# Patient Record
Sex: MALE | Race: WHITE | Employment: OTHER | URBAN - METROPOLITAN AREA
[De-identification: names, ages, dates, MRNs, and addresses within clinical notes are randomized per-mention and may not be internally consistent; named-entity substitution may affect disease eponyms.]

---

## 2019-01-03 ENCOUNTER — HOSPITAL ENCOUNTER (OUTPATIENT)
Age: 84
Setting detail: OUTPATIENT SURGERY
Discharge: HOME | End: 2019-01-03
Attending: SURGERY | Admitting: SURGERY
Payer: MEDICARE

## 2019-01-03 VITALS
OXYGEN SATURATION: 96 % | RESPIRATION RATE: 17 BRPM | SYSTOLIC BLOOD PRESSURE: 155 MMHG | HEART RATE: 68 BPM | DIASTOLIC BLOOD PRESSURE: 84 MMHG | BODY MASS INDEX: 25.77 KG/M2 | WEIGHT: 180 LBS | HEIGHT: 70 IN | TEMPERATURE: 97.2 F

## 2019-01-03 PROBLEM — K40.90 RIGHT INGUINAL HERNIA: Status: RESOLVED | Noted: 2019-01-03 | Resolved: 2019-01-03

## 2019-01-03 PROBLEM — R35.1 BENIGN PROSTATIC HYPERPLASIA WITH NOCTURIA: Chronic | Status: ACTIVE | Noted: 2019-01-03

## 2019-01-03 PROBLEM — K40.90 RIGHT INGUINAL HERNIA: Status: ACTIVE | Noted: 2019-01-03

## 2019-01-03 PROBLEM — N40.1 BENIGN PROSTATIC HYPERPLASIA WITH NOCTURIA: Chronic | Status: ACTIVE | Noted: 2019-01-03

## 2019-01-03 PROCEDURE — 6360000002 HC RX W HCPCS: Performed by: SURGERY

## 2019-01-03 PROCEDURE — 99153 MOD SED SAME PHYS/QHP EA: CPT | Performed by: SURGERY

## 2019-01-03 PROCEDURE — 2500000003 HC RX 250 WO HCPCS: Performed by: SURGERY

## 2019-01-03 PROCEDURE — 2709999900 HC NON-CHARGEABLE SUPPLY: Performed by: SURGERY

## 2019-01-03 PROCEDURE — C1781 MESH (IMPLANTABLE): HCPCS | Performed by: SURGERY

## 2019-01-03 PROCEDURE — 2580000003 HC RX 258: Performed by: SURGERY

## 2019-01-03 PROCEDURE — 99152 MOD SED SAME PHYS/QHP 5/>YRS: CPT | Performed by: SURGERY

## 2019-01-03 PROCEDURE — 7100000011 HC PHASE II RECOVERY - ADDTL 15 MIN: Performed by: SURGERY

## 2019-01-03 PROCEDURE — 3600000012 HC SURGERY LEVEL 2 ADDTL 15MIN: Performed by: SURGERY

## 2019-01-03 PROCEDURE — 7100000010 HC PHASE II RECOVERY - FIRST 15 MIN: Performed by: SURGERY

## 2019-01-03 PROCEDURE — 3600000002 HC SURGERY LEVEL 2 BASE: Performed by: SURGERY

## 2019-01-03 DEVICE — MESH SURG W4XL6IN STD FOR INGUINAL HERN REP PROLITE ULT: Type: IMPLANTABLE DEVICE | Site: GROIN | Status: FUNCTIONAL

## 2019-01-03 RX ORDER — FAMOTIDINE 20 MG/1
20 TABLET, FILM COATED ORAL 2 TIMES DAILY
COMMUNITY

## 2019-01-03 RX ORDER — FENTANYL CITRATE 50 UG/ML
INJECTION, SOLUTION INTRAMUSCULAR; INTRAVENOUS PRN
Status: DISCONTINUED | OUTPATIENT
Start: 2019-01-03 | End: 2019-01-03 | Stop reason: HOSPADM

## 2019-01-03 RX ORDER — SODIUM CHLORIDE, SODIUM LACTATE, POTASSIUM CHLORIDE, CALCIUM CHLORIDE 600; 310; 30; 20 MG/100ML; MG/100ML; MG/100ML; MG/100ML
INJECTION, SOLUTION INTRAVENOUS CONTINUOUS
Status: DISCONTINUED | OUTPATIENT
Start: 2019-01-03 | End: 2019-01-03 | Stop reason: HOSPADM

## 2019-01-03 RX ORDER — CEFAZOLIN SODIUM 2 G/50ML
2 SOLUTION INTRAVENOUS ONCE
Status: DISCONTINUED | OUTPATIENT
Start: 2019-01-03 | End: 2019-01-03 | Stop reason: HOSPADM

## 2019-01-03 RX ORDER — KETOROLAC TROMETHAMINE 15 MG/ML
15 INJECTION, SOLUTION INTRAMUSCULAR; INTRAVENOUS ONCE
Status: COMPLETED | OUTPATIENT
Start: 2019-01-03 | End: 2019-01-03

## 2019-01-03 RX ORDER — SUMATRIPTAN 50 MG/1
50 TABLET, FILM COATED ORAL
COMMUNITY

## 2019-01-03 RX ORDER — ACETAMINOPHEN 500 MG
500 TABLET ORAL EVERY 6 HOURS PRN
COMMUNITY

## 2019-01-03 RX ORDER — OMEPRAZOLE 20 MG/1
20 CAPSULE, DELAYED RELEASE ORAL DAILY
COMMUNITY

## 2019-01-03 RX ORDER — MIDAZOLAM HYDROCHLORIDE 1 MG/ML
INJECTION INTRAMUSCULAR; INTRAVENOUS PRN
Status: DISCONTINUED | OUTPATIENT
Start: 2019-01-03 | End: 2019-01-03 | Stop reason: HOSPADM

## 2019-01-03 RX ORDER — MAGNESIUM OXIDE 400 MG/1
400 TABLET ORAL DAILY
COMMUNITY

## 2019-01-03 RX ORDER — ANTIOX #8/OM3/DHA/EPA/LUT/ZEAX 250-2.5 MG
2 CAPSULE ORAL
COMMUNITY

## 2019-01-03 RX ADMIN — KETOROLAC TROMETHAMINE 15 MG: 15 INJECTION, SOLUTION INTRAMUSCULAR; INTRAVENOUS at 09:11

## 2019-01-03 RX ADMIN — SODIUM CHLORIDE, POTASSIUM CHLORIDE, SODIUM LACTATE AND CALCIUM CHLORIDE: 600; 310; 30; 20 INJECTION, SOLUTION INTRAVENOUS at 06:57

## 2019-01-03 ASSESSMENT — PAIN SCALES - GENERAL
PAINLEVEL_OUTOF10: 0

## 2019-01-03 ASSESSMENT — PAIN - FUNCTIONAL ASSESSMENT: PAIN_FUNCTIONAL_ASSESSMENT: 0-10

## 2019-01-03 ASSESSMENT — PAIN DESCRIPTION - DESCRIPTORS: DESCRIPTORS: NAGGING

## 2023-03-01 PROBLEM — C44.329 SQUAMOUS CELL CARCINOMA OF CHEEK: Status: ACTIVE | Noted: 2023-03-01

## 2023-03-01 PROBLEM — C44.01 BASAL CELL CARCINOMA OF LOWER LIP: Status: ACTIVE | Noted: 2023-03-01

## 2023-03-01 PROBLEM — C44.42 SQUAMOUS CELL CARCINOMA, SCALP/NECK: Status: ACTIVE | Noted: 2023-03-01

## 2023-03-29 ENCOUNTER — HOSPITAL ENCOUNTER (OUTPATIENT)
Dept: PREADMISSION TESTING | Age: 88
Discharge: HOME | End: 2023-04-02

## 2023-03-29 VITALS
HEIGHT: 70 IN | BODY MASS INDEX: 26.48 KG/M2 | RESPIRATION RATE: 16 BRPM | OXYGEN SATURATION: 96 % | WEIGHT: 185 LBS | DIASTOLIC BLOOD PRESSURE: 87 MMHG | SYSTOLIC BLOOD PRESSURE: 134 MMHG

## 2023-03-29 RX ORDER — AZELASTINE 1 MG/ML
SPRAY, METERED NASAL
COMMUNITY
Start: 2023-01-23

## 2023-03-29 NOTE — DISCHARGE INSTRUCTIONS
Preoperative Instructions:    Stop eating solid foods at midnight the night prior to your surgery. Stop drinking clear liquids at midnight the night prior to your surgery. Arrive at the surgery center (3rd entrance) on ___1-14-19____________ by ___0600am____________. Please stop any blood thinning medications as directed by your surgeon or prescribing physician. Failure to stop certain medications may interfere with your scheduled surgery. These may include: Aspirin, Coumadin, Plavix, NSAIDS (Motrin, Aleve, Advil, Mobic, Celebrex), Eliquis, Pradaxa, Xarelto, Fish oil, and herbal supplements. Hold Multivitamin as directed. You may continue the rest of your medications through the night before surgery unless instructed otherwise. Day of surgery please take only the following medication(s) with a small sip of water: Omeprazole      Please  shower with antibacterial soap and water the morning of surgery. Please bring denture cup. Reminders:  -If you are going home the day of your procedure, you will need a family member or friend to stay during the procedure and drive you home after your procedure. Your  must be 25years of age or older and able to sign off on your discharge instructions.    -If you are going home the same day of your surgery, someone must remain with you for the first 24 hours after your surgery if you receive sedation or anesthesia.      -Please do not wear any jewelery , lotions, or body piercing the day of surgery

## 2023-04-11 ENCOUNTER — HOSPITAL ENCOUNTER (OUTPATIENT)
Age: 88
Setting detail: OUTPATIENT SURGERY
Discharge: HOME | End: 2023-04-11
Attending: PLASTIC SURGERY | Admitting: PLASTIC SURGERY
Payer: MEDICARE

## 2023-04-11 VITALS
OXYGEN SATURATION: 96 % | TEMPERATURE: 95.5 F | HEART RATE: 59 BPM | SYSTOLIC BLOOD PRESSURE: 91 MMHG | BODY MASS INDEX: 25.77 KG/M2 | WEIGHT: 180 LBS | RESPIRATION RATE: 17 BRPM | DIASTOLIC BLOOD PRESSURE: 75 MMHG | HEIGHT: 70 IN

## 2023-04-11 DIAGNOSIS — C44.42 SQUAMOUS CELL CARCINOMA OF SCALP: ICD-10-CM

## 2023-04-11 DIAGNOSIS — C44.329 SQUAMOUS CELL CANCER OF SKIN OF LEFT CHEEK: ICD-10-CM

## 2023-04-11 DIAGNOSIS — G89.18 PAIN FOLLOWING SURGERY OR PROCEDURE: Primary | ICD-10-CM

## 2023-04-11 DIAGNOSIS — C44.319 BASAL CELL CARCINOMA OF CHIN: ICD-10-CM

## 2023-04-11 PROCEDURE — 7100000010 HC PHASE II RECOVERY - FIRST 15 MIN: Performed by: PLASTIC SURGERY

## 2023-04-11 PROCEDURE — 3600000012 HC SURGERY LEVEL 2 ADDTL 15MIN: Performed by: PLASTIC SURGERY

## 2023-04-11 PROCEDURE — 3700000001 HC ADD 15 MINUTES (ANESTHESIA): Performed by: PLASTIC SURGERY

## 2023-04-11 PROCEDURE — 88305 TISSUE EXAM BY PATHOLOGIST: CPT

## 2023-04-11 PROCEDURE — 88331 PATH CONSLTJ SURG 1 BLK 1SPC: CPT

## 2023-04-11 PROCEDURE — 6360000002 HC RX W HCPCS

## 2023-04-11 PROCEDURE — 2500000003 HC RX 250 WO HCPCS: Performed by: PLASTIC SURGERY

## 2023-04-11 PROCEDURE — 7100000000 HC PACU RECOVERY - FIRST 15 MIN: Performed by: PLASTIC SURGERY

## 2023-04-11 PROCEDURE — 88332 PATH CONSLTJ SURG EA ADD BLK: CPT

## 2023-04-11 PROCEDURE — 2709999900 HC NON-CHARGEABLE SUPPLY: Performed by: PLASTIC SURGERY

## 2023-04-11 PROCEDURE — 7100000011 HC PHASE II RECOVERY - ADDTL 15 MIN: Performed by: PLASTIC SURGERY

## 2023-04-11 PROCEDURE — 7100000001 HC PACU RECOVERY - ADDTL 15 MIN: Performed by: PLASTIC SURGERY

## 2023-04-11 PROCEDURE — 3700000000 HC ANESTHESIA ATTENDED CARE: Performed by: PLASTIC SURGERY

## 2023-04-11 PROCEDURE — 3600000002 HC SURGERY LEVEL 2 BASE: Performed by: PLASTIC SURGERY

## 2023-04-11 PROCEDURE — 6370000000 HC RX 637 (ALT 250 FOR IP)

## 2023-04-11 RX ORDER — MIDAZOLAM HYDROCHLORIDE 2 MG/2ML
2 INJECTION, SOLUTION INTRAMUSCULAR; INTRAVENOUS
Status: DISCONTINUED | OUTPATIENT
Start: 2023-04-11 | End: 2023-04-11 | Stop reason: HOSPADM

## 2023-04-11 RX ORDER — SODIUM CHLORIDE 9 MG/ML
INJECTION, SOLUTION INTRAVENOUS PRN
Status: DISCONTINUED | OUTPATIENT
Start: 2023-04-11 | End: 2023-04-11 | Stop reason: HOSPADM

## 2023-04-11 RX ORDER — ONDANSETRON 2 MG/ML
4 INJECTION INTRAMUSCULAR; INTRAVENOUS
Status: COMPLETED | OUTPATIENT
Start: 2023-04-11 | End: 2023-04-11

## 2023-04-11 RX ORDER — SODIUM CHLORIDE 9 MG/ML
25 INJECTION, SOLUTION INTRAVENOUS PRN
Status: DISCONTINUED | OUTPATIENT
Start: 2023-04-11 | End: 2023-04-11 | Stop reason: HOSPADM

## 2023-04-11 RX ORDER — SODIUM CHLORIDE, SODIUM LACTATE, POTASSIUM CHLORIDE, CALCIUM CHLORIDE 600; 310; 30; 20 MG/100ML; MG/100ML; MG/100ML; MG/100ML
INJECTION, SOLUTION INTRAVENOUS CONTINUOUS
Status: DISCONTINUED | OUTPATIENT
Start: 2023-04-11 | End: 2023-04-11 | Stop reason: HOSPADM

## 2023-04-11 RX ORDER — BUPIVACAINE HYDROCHLORIDE AND EPINEPHRINE 2.5; 5 MG/ML; UG/ML
INJECTION, SOLUTION EPIDURAL; INFILTRATION; INTRACAUDAL; PERINEURAL PRN
Status: DISCONTINUED | OUTPATIENT
Start: 2023-04-11 | End: 2023-04-11 | Stop reason: ALTCHOICE

## 2023-04-11 RX ORDER — MEPERIDINE HYDROCHLORIDE 50 MG/ML
12.5 INJECTION INTRAMUSCULAR; INTRAVENOUS; SUBCUTANEOUS EVERY 5 MIN PRN
Status: DISCONTINUED | OUTPATIENT
Start: 2023-04-11 | End: 2023-04-11 | Stop reason: HOSPADM

## 2023-04-11 RX ORDER — CEFAZOLIN 2 G/1
INJECTION, POWDER, FOR SOLUTION INTRAMUSCULAR; INTRAVENOUS
Status: COMPLETED
Start: 2023-04-11 | End: 2023-04-11

## 2023-04-11 RX ORDER — LABETALOL HYDROCHLORIDE 5 MG/ML
10 INJECTION, SOLUTION INTRAVENOUS
Status: DISCONTINUED | OUTPATIENT
Start: 2023-04-11 | End: 2023-04-11 | Stop reason: HOSPADM

## 2023-04-11 RX ORDER — SODIUM CHLORIDE 0.9 % (FLUSH) 0.9 %
5-40 SYRINGE (ML) INJECTION EVERY 12 HOURS SCHEDULED
Status: DISCONTINUED | OUTPATIENT
Start: 2023-04-11 | End: 2023-04-11 | Stop reason: HOSPADM

## 2023-04-11 RX ORDER — HYDROCODONE BITARTRATE AND ACETAMINOPHEN 5; 325 MG/1; MG/1
1 TABLET ORAL
Status: COMPLETED | OUTPATIENT
Start: 2023-04-11 | End: 2023-04-11

## 2023-04-11 RX ORDER — SODIUM CHLORIDE 0.9 % (FLUSH) 0.9 %
5-40 SYRINGE (ML) INJECTION PRN
Status: DISCONTINUED | OUTPATIENT
Start: 2023-04-11 | End: 2023-04-11 | Stop reason: HOSPADM

## 2023-04-11 RX ORDER — HYDROCODONE BITARTRATE AND ACETAMINOPHEN 5; 325 MG/1; MG/1
TABLET ORAL
Status: COMPLETED
Start: 2023-04-11 | End: 2023-04-11

## 2023-04-11 RX ORDER — HYDROCODONE BITARTRATE AND ACETAMINOPHEN 5; 325 MG/1; MG/1
1 TABLET ORAL EVERY 6 HOURS PRN
Qty: 20 TABLET | Refills: 0 | Status: SHIPPED | OUTPATIENT
Start: 2023-04-11 | End: 2023-04-16

## 2023-04-11 RX ORDER — PROMETHAZINE HYDROCHLORIDE 25 MG/ML
6.25 INJECTION, SOLUTION INTRAMUSCULAR; INTRAVENOUS EVERY 5 MIN PRN
Status: DISCONTINUED | OUTPATIENT
Start: 2023-04-11 | End: 2023-04-11 | Stop reason: HOSPADM

## 2023-04-11 RX ORDER — GLYCOPYRROLATE 0.2 MG/ML
0.4 INJECTION INTRAMUSCULAR; INTRAVENOUS ONCE
Status: DISCONTINUED | OUTPATIENT
Start: 2023-04-11 | End: 2023-04-11 | Stop reason: HOSPADM

## 2023-04-11 RX ORDER — DIPHENHYDRAMINE HYDROCHLORIDE 50 MG/ML
12.5 INJECTION INTRAMUSCULAR; INTRAVENOUS
Status: DISCONTINUED | OUTPATIENT
Start: 2023-04-11 | End: 2023-04-11 | Stop reason: HOSPADM

## 2023-04-11 RX ORDER — CEPHALEXIN 500 MG/1
500 CAPSULE ORAL 3 TIMES DAILY
Qty: 21 CAPSULE | Refills: 0 | Status: SHIPPED | OUTPATIENT
Start: 2023-04-11 | End: 2023-04-18

## 2023-04-11 RX ORDER — OXYCODONE HYDROCHLORIDE AND ACETAMINOPHEN 5; 325 MG/1; MG/1
1 TABLET ORAL
Status: DISCONTINUED | OUTPATIENT
Start: 2023-04-11 | End: 2023-04-11

## 2023-04-11 RX ORDER — IPRATROPIUM BROMIDE AND ALBUTEROL SULFATE 2.5; .5 MG/3ML; MG/3ML
1 SOLUTION RESPIRATORY (INHALATION)
Status: DISCONTINUED | OUTPATIENT
Start: 2023-04-11 | End: 2023-04-11 | Stop reason: HOSPADM

## 2023-04-11 RX ORDER — METOCLOPRAMIDE HYDROCHLORIDE 5 MG/ML
10 INJECTION INTRAMUSCULAR; INTRAVENOUS
Status: DISCONTINUED | OUTPATIENT
Start: 2023-04-11 | End: 2023-04-11 | Stop reason: HOSPADM

## 2023-04-11 RX ORDER — HYDRALAZINE HYDROCHLORIDE 20 MG/ML
10 INJECTION INTRAMUSCULAR; INTRAVENOUS
Status: DISCONTINUED | OUTPATIENT
Start: 2023-04-11 | End: 2023-04-11 | Stop reason: HOSPADM

## 2023-04-11 RX ORDER — OXYCODONE HYDROCHLORIDE AND ACETAMINOPHEN 5; 325 MG/1; MG/1
2 TABLET ORAL
Status: DISCONTINUED | OUTPATIENT
Start: 2023-04-11 | End: 2023-04-11

## 2023-04-11 RX ADMIN — HYDROCODONE BITARTRATE AND ACETAMINOPHEN 1 TABLET: 5; 325 TABLET ORAL at 10:17

## 2023-04-11 RX ADMIN — HYDROMORPHONE HYDROCHLORIDE 0.5 MG: 1 INJECTION, SOLUTION INTRAMUSCULAR; INTRAVENOUS; SUBCUTANEOUS at 09:53

## 2023-04-11 RX ADMIN — Medication 0.5 MG: at 09:53

## 2023-04-11 RX ADMIN — HYDROMORPHONE HYDROCHLORIDE 0.5 MG: 1 INJECTION, SOLUTION INTRAMUSCULAR; INTRAVENOUS; SUBCUTANEOUS at 09:37

## 2023-04-11 RX ADMIN — Medication 0.5 MG: at 09:37

## 2023-04-11 ASSESSMENT — PAIN DESCRIPTION - LOCATION
LOCATION: HEAD;THROAT
LOCATION: HEAD

## 2023-04-11 ASSESSMENT — PAIN DESCRIPTION - ORIENTATION
ORIENTATION: MID
ORIENTATION: MID

## 2023-04-11 ASSESSMENT — PAIN SCALES - GENERAL
PAINLEVEL_OUTOF10: 7
PAINLEVEL_OUTOF10: 10
PAINLEVEL_OUTOF10: 2
PAINLEVEL_OUTOF10: 7

## 2023-04-11 ASSESSMENT — PAIN DESCRIPTION - PAIN TYPE
TYPE: SURGICAL PAIN
TYPE: SURGICAL PAIN

## 2023-04-11 NOTE — H&P
Office Note     JORGE Cline MD, FACS     Subjective:      Patient ID: Wallace Abdul is a 81 y.o. male. HPI     Patient comes in today for Consultation regarding multiple facial malignancies found on shave biopsy by Dr. Poncho Hylton, dermatology. 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       Review of Systems     Past Medical History        Past Medical History:   Diagnosis Date    GERD (gastroesophageal reflux disease)           Past Surgical History         Past Surgical History:   Procedure Laterality Date    COLONOSCOPY   2016     x3    CYSTOSCOPY        HERNIA REPAIR Right 1/3/2019     HERNIA INGUINAL REPAIR RIGHT WITH MESH performed by Anitra Winn MD at [facility] 2014    INGUINAL HERNIA REPAIR Right 01/03/2019    PRE-MALIGNANT / BENIGN SKIN LESION EXCISION        PROSTATE BIOPSY   2015    SKIN BIOPSY        VASECTOMY                   Allergies   Allergen Reactions    Codeine         n/v    Tramadol         n/v      Current Facility-Administered Medications          Current Outpatient Medications   Medication Sig Dispense Refill    magnesium oxide (MAG-OX) 400 MG tablet Take 400 mg by mouth daily        SUMAtriptan (IMITREX) 50 MG tablet Take 50 mg by mouth once as needed for Migraine        Multiple Vitamins-Minerals (PRESERVISION AREDS 2) CAPS Take 2 tablets by mouth        omeprazole (PRILOSEC) 20 MG delayed release capsule Take 20 mg by mouth daily        acetaminophen (TYLENOL) 500 MG tablet Take 500 mg by mouth every 6 hours as needed for Pain        famotidine (PEPCID) 20 MG tablet Take 20 mg by mouth 2 times daily          No

## 2023-04-11 NOTE — OP NOTE
frozen section. 2. Forehead squamous cell carcinoma with margins clear confirmed on frozen section. 3.  Right lower lip basal cell carcinoma with 9:00 margin still involved. A new 9:00 margin was sent for permanent section. Detailed Description of Procedure: The patient was brought to the operating room and placed under general anesthesia. His face was prepped and draped in sterile fashion. The squamous cell carcinoma on the forehead, the squamous of carcinoma on the left cheek, and the basal carcinoma in his right lower lip were identified. 0.25% Marcaine with epinephrine was injected into the tissue surrounding each of the lesions. Each lesion was excised with a #15 blade and submitted for frozen section. Frozen section confirmed that the forehead squamous cell carcinoma and the left cheek squamous cell carcinoma had margins clear. The right lower lip basal cell carcinoma still had a small focus of basal cell at the 9:00 margin. A new 7-10 o'clock margin was excised and the new 9:00 margin marked. Total excised diameter of the squamous of carcinoma on the forehead was 2 x 2cm closed with a 5 x 6 cm bilateral V-Y-S flap closure. This was sutured with 3-0 Monocryl in the deep dermal layer, 4 Monocryl in the dermal layer, followed by 4-0 Monocryl running subcuticular on the skin and Dermabond for final closure. The left cheek defect measured approximately 1-1/2 x 1/2 cm. A rotational flap was diagrammed with a surgical marker and incised with a #15 blade. The flap measured approximate 3 x 4 cm and was rotated into close the defect. This was trimmed to fit and sutured with 3-0 Monocryl deep dermal layer, 4-0 Monocryl dermal layer, and 5-0 nylon on the skin. Steri-Strips were applied. Finally the right lower lip defect was addressed. This measured approximately 1-1/2 x 1 cm. A 3 x 4 cm rotational flap was diagrammed with a surgical marker and incised with a #15 blade.   The flap was elevated

## 2023-04-13 ENCOUNTER — HOSPITAL ENCOUNTER (EMERGENCY)
Age: 88
Discharge: HOME | End: 2023-04-13
Attending: EMERGENCY MEDICINE
Payer: MEDICARE

## 2023-04-13 VITALS
DIASTOLIC BLOOD PRESSURE: 74 MMHG | TEMPERATURE: 98.6 F | SYSTOLIC BLOOD PRESSURE: 130 MMHG | OXYGEN SATURATION: 93 % | HEIGHT: 70 IN | WEIGHT: 180 LBS | RESPIRATION RATE: 16 BRPM | BODY MASS INDEX: 25.77 KG/M2 | HEART RATE: 88 BPM

## 2023-04-13 DIAGNOSIS — N17.9 ACUTE KIDNEY INJURY (HCC): ICD-10-CM

## 2023-04-13 DIAGNOSIS — R33.8 ACUTE URINARY RETENTION: Primary | ICD-10-CM

## 2023-04-13 LAB
ABSOLUTE EOS #: 0 K/UL (ref 0–0.4)
ABSOLUTE LYMPH #: 0.61 K/UL (ref 1–4.8)
ABSOLUTE MONO #: 2.14 K/UL (ref 0.1–0.8)
ANION GAP SERPL CALCULATED.3IONS-SCNC: 13 MMOL/L (ref 9–17)
BACTERIA: ABNORMAL
BASOPHILS # BLD: 0 % (ref 0–2)
BASOPHILS ABSOLUTE: 0 K/UL (ref 0–0.2)
BILIRUBIN URINE: NEGATIVE
BUN SERPL-MCNC: 41 MG/DL (ref 8–23)
CALCIUM SERPL-MCNC: 10.1 MG/DL (ref 8.6–10.4)
CHLORIDE SERPL-SCNC: 104 MMOL/L (ref 98–107)
CO2 SERPL-SCNC: 21 MMOL/L (ref 20–31)
COLOR: YELLOW
CREAT SERPL-MCNC: 3.18 MG/DL (ref 0.7–1.2)
EOSINOPHILS RELATIVE PERCENT: 0 % (ref 1–4)
EPITHELIAL CELLS UA: ABNORMAL /HPF (ref 0–5)
GFR SERPL CREATININE-BSD FRML MDRD: 18 ML/MIN/1.73M2
GLUCOSE SERPL-MCNC: 126 MG/DL (ref 70–99)
GLUCOSE UR STRIP.AUTO-MCNC: NEGATIVE MG/DL
HCT VFR BLD AUTO: 41 % (ref 41–53)
HGB BLD-MCNC: 13.9 G/DL (ref 13.5–17.5)
KETONES UR STRIP.AUTO-MCNC: NEGATIVE MG/DL
LEUKOCYTE ESTERASE UR QL STRIP.AUTO: NEGATIVE
LYMPHOCYTES # BLD: 4 % (ref 24–44)
MCH RBC QN AUTO: 31.6 PG (ref 26–34)
MCHC RBC AUTO-ENTMCNC: 34 G/DL (ref 31–37)
MCV RBC AUTO: 93.1 FL (ref 80–100)
MONOCYTES # BLD: 14 % (ref 1–7)
MORPHOLOGY: NORMAL
NITRITE UR QL STRIP.AUTO: NEGATIVE
PDW BLD-RTO: 12.9 % (ref 12.5–15.4)
PLATELET # BLD AUTO: 158 K/UL (ref 140–450)
PMV BLD AUTO: 8.3 FL (ref 6–12)
POTASSIUM SERPL-SCNC: 4.3 MMOL/L (ref 3.7–5.3)
PROT UR STRIP.AUTO-MCNC: 6 MG/DL (ref 5–8)
PROT UR STRIP.AUTO-MCNC: NEGATIVE MG/DL
RBC # BLD: 4.41 M/UL (ref 4.5–5.9)
RBC CLUMPS #/AREA URNS AUTO: ABNORMAL /HPF (ref 0–2)
SEG NEUTROPHILS: 82 % (ref 36–66)
SEGMENTED NEUTROPHILS ABSOLUTE COUNT: 12.55 K/UL (ref 1.8–7.7)
SODIUM SERPL-SCNC: 138 MMOL/L (ref 135–144)
SPECIFIC GRAVITY UA: 1.01 (ref 1–1.03)
SURGICAL PATHOLOGY REPORT: NORMAL
TURBIDITY: CLEAR
URINE HGB: ABNORMAL
UROBILINOGEN, URINE: NORMAL
WBC # BLD AUTO: 15.3 K/UL (ref 3.5–11)
WBC UA: ABNORMAL /HPF (ref 0–5)

## 2023-04-13 PROCEDURE — 99283 EMERGENCY DEPT VISIT LOW MDM: CPT

## 2023-04-13 PROCEDURE — 6370000000 HC RX 637 (ALT 250 FOR IP)

## 2023-04-13 PROCEDURE — 80048 BASIC METABOLIC PNL TOTAL CA: CPT

## 2023-04-13 PROCEDURE — 81001 URINALYSIS AUTO W/SCOPE: CPT

## 2023-04-13 PROCEDURE — 36415 COLL VENOUS BLD VENIPUNCTURE: CPT

## 2023-04-13 PROCEDURE — 85025 COMPLETE CBC W/AUTO DIFF WBC: CPT

## 2023-04-13 RX ORDER — LIDOCAINE HYDROCHLORIDE 20 MG/ML
JELLY TOPICAL
Status: COMPLETED
Start: 2023-04-13 | End: 2023-04-13

## 2023-04-13 RX ORDER — LIDOCAINE HYDROCHLORIDE 20 MG/ML
JELLY TOPICAL ONCE
Status: COMPLETED | OUTPATIENT
Start: 2023-04-13 | End: 2023-04-13

## 2023-04-13 RX ORDER — 0.9 % SODIUM CHLORIDE 0.9 %
1000 INTRAVENOUS SOLUTION INTRAVENOUS ONCE
Status: DISCONTINUED | OUTPATIENT
Start: 2023-04-13 | End: 2023-04-13 | Stop reason: HOSPADM

## 2023-04-13 RX ADMIN — LIDOCAINE HYDROCHLORIDE: 20 JELLY TOPICAL at 12:15

## 2023-04-13 ASSESSMENT — PAIN SCALES - GENERAL: PAINLEVEL_OUTOF10: 10

## 2023-04-13 ASSESSMENT — PAIN DESCRIPTION - LOCATION: LOCATION: ABDOMEN

## 2023-04-13 ASSESSMENT — ENCOUNTER SYMPTOMS
RHINORRHEA: 0
SORE THROAT: 0
COUGH: 0
ABDOMINAL PAIN: 1
BACK PAIN: 0
NAUSEA: 1
SHORTNESS OF BREATH: 0
PHOTOPHOBIA: 0
VOMITING: 1

## 2023-04-13 ASSESSMENT — PAIN DESCRIPTION - ORIENTATION: ORIENTATION: LOWER

## 2023-04-13 ASSESSMENT — PAIN - FUNCTIONAL ASSESSMENT
PAIN_FUNCTIONAL_ASSESSMENT: NONE - DENIES PAIN
PAIN_FUNCTIONAL_ASSESSMENT: 0-10
PAIN_FUNCTIONAL_ASSESSMENT: NONE - DENIES PAIN

## 2023-04-13 ASSESSMENT — LIFESTYLE VARIABLES: HOW OFTEN DO YOU HAVE A DRINK CONTAINING ALCOHOL: NEVER

## 2023-04-13 NOTE — ED TRIAGE NOTES
Pt to ED with c/o post op issues. Pt had skin cancer on face removal Tuesday and since has not felt well. Pt denies pain at surgical sites but has incontinence, no appetite, N/V, and feeling very tired.

## 2023-04-13 NOTE — ED NOTES
Garrett care and bad emptying care explained with pt and wife and both verbalize understanding how to empty bag and adjust as needed.       Maliha Gilman RN  04/13/23 8610

## 2023-04-13 NOTE — ED PROVIDER NOTES
to leave the Garrett in place and have the patient follow-up with urology as an outpatient. Follow-up information provided. Amount and/or Complexity of Data Reviewed  Labs: ordered. Decision-making details documented in ED Course. Risk  Prescription drug management. DIAGNOSTIC RESULTS     EKG: All EKG's are interpreted by the Emergency Department Physician who either signs or Co-signs this chart in the absence of a cardiologist.    None     RADIOLOGY:        Interpretation per the Radiologist below, if available at the time of this note:    No results found.       LABS:  Results for orders placed or performed during the hospital encounter of 04/13/23   CBC with Auto Differential   Result Value Ref Range    WBC 15.3 (H) 3.5 - 11.0 k/uL    RBC 4.41 (L) 4.5 - 5.9 m/uL    Hemoglobin 13.9 13.5 - 17.5 g/dL    Hematocrit 41.0 41 - 53 %    MCV 93.1 80 - 100 fL    MCH 31.6 26 - 34 pg    MCHC 34.0 31 - 37 g/dL    RDW 12.9 12.5 - 15.4 %    Platelets 528 022 - 952 k/uL    MPV 8.3 6.0 - 12.0 fL    Seg Neutrophils 82 (H) 36 - 66 %    Lymphocytes 4 (L) 24 - 44 %    Monocytes 14 (H) 1 - 7 %    Eosinophils % 0 (L) 1 - 4 %    Basophils 0 0 - 2 %    Segs Absolute 12.55 (H) 1.8 - 7.7 k/uL    Absolute Lymph # 0.61 (L) 1.0 - 4.8 k/uL    Absolute Mono # 2.14 (H) 0.1 - 0.8 k/uL    Absolute Eos # 0.00 0.0 - 0.4 k/uL    Basophils Absolute 0.00 0.0 - 0.2 k/uL    Morphology Normal    Basic Metabolic Panel   Result Value Ref Range    Glucose 126 (H) 70 - 99 mg/dL    BUN 41 (H) 8 - 23 mg/dL    Creatinine 3.18 (H) 0.70 - 1.20 mg/dL    Est, Glom Filt Rate 18 (L) >60 mL/min/1.73m2    Calcium 10.1 8.6 - 10.4 mg/dL    Sodium 138 135 - 144 mmol/L    Potassium 4.3 3.7 - 5.3 mmol/L    Chloride 104 98 - 107 mmol/L    CO2 21 20 - 31 mmol/L    Anion Gap 13 9 - 17 mmol/L   Urinalysis with Microscopic   Result Value Ref Range    Color, UA Yellow Yellow    Turbidity UA Clear Clear    Glucose, Ur NEGATIVE NEGATIVE    Bilirubin Urine NEGATIVE No pertinent past medical history

## 2023-04-13 NOTE — DISCHARGE INSTRUCTIONS
Please understand that at this time there is no evidence for a more serious underlying process, but that early in the process of an illness or injury, an emergency department workup can be falsely reassuring. You should contact your family doctor within the next 48 hours for a follow up appointment    Sergio Louis!!!    From [facility] ([facility]) and [facility]    On behalf of the Emergency Department staff at [facility]), I would like to thank you for giving us the opportunity to address your health care needs and concerns. We hope that during your visit, our service was delivered in a professional and caring manner. Please keep [facility] ([facility]) in mind as we walk with you down the path to your own personal wellness. Please expect an automated text message or email from us so we can ask a few questions about your health and progress. Based on your answers, a clinician may call you back to offer help and instructions. Please understand that early in the process of an illness or injury, an emergency department workup can be falsely reassuring. If you notice any worsening, changing or persistent symptoms please call your family doctor or return to the ER immediately. Tell us how we did during your visit at [email or web address]   and let us know about your experience

## 2023-04-14 ASSESSMENT — ENCOUNTER SYMPTOMS
PHOTOPHOBIA: 0
RHINORRHEA: 0
VOMITING: 1
NAUSEA: 1
SHORTNESS OF BREATH: 0
BACK PAIN: 0
COUGH: 0
SORE THROAT: 0
ABDOMINAL PAIN: 1

## 2023-06-19 PROBLEM — D48.9 NEOPLASM OF UNCERTAIN BEHAVIOR: Status: ACTIVE | Noted: 2023-06-19

## 2024-07-24 ENCOUNTER — HOSPITAL ENCOUNTER (OUTPATIENT)
Dept: PHYSICAL THERAPY | Facility: CLINIC | Age: 89
Setting detail: THERAPIES SERIES
Discharge: HOME | End: 2024-07-24
Payer: MEDICARE

## 2024-07-24 PROCEDURE — 97161 PT EVAL LOW COMPLEX 20 MIN: CPT

## 2024-07-24 PROCEDURE — 97110 THERAPEUTIC EXERCISES: CPT

## 2024-07-24 NOTE — CONSULTS
[] [facility]  Outpatient Rehabilitation &  Therapy  [address]  [phone]  F: [phone] [] [facility]  Outpatient Rehabilitation &  Therapy  [address]   [address]  P: [phone]  F: [phone] [x] [facility]  Outpatient Rehabilitation &  Therapy  [address]  P: [phone]  F: [phone] [] [facility]  Outpatient Rehabilitation &  Therapy  [address]  P: [phone]  F: [phone] [] [facility]  Outpatient Rehabilitation &  Therapy  [address]   [address]   P: [phone]  F: [phone]      Physical Therapy Spine Evaluation    Date:  2024  Patient: Huber Fontaine  : 1935  MRN: [id number]  Physician: Yong Mathur MD     Insurance:  Medicare- neris yr, BMN, no auth/copay/ded, co-ins 4%, OOP 1500/1284.03rem, est 4.55   Medical Diagnosis:  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 Patient reports that he has been experiencing low back pain for years with insidious onset. States that the pain has progressively become worse over the years and describes the pain as achy, however can intermittently be a sharp pain. Patient reports some radiating pain into his (R) LE down to his knee. States that pain mainly occurs with standing and walking longer distances. States that he saw his physician, where he x-rays were taken. Patient states that physician did not go through x-rays with him, however stated that he wants an MRI at this time and also referred patient to physical therapy. MRI has not been scheduled yet.     PMHx: [x] Unremarkable [] Diabetes [] HTN  [] Pacemaker   [] MI/Heart Problems  [] Cancer [] Arthritis [x] Other:

## 2024-07-25 NOTE — FLOWSHEET NOTE
[facility] Fall Risk Assessment    Patient Name:  Huber Fontaine  : 1935    Risk Factor Scale  Score   History of Falls [] Yes  [x] No 25  0 0   Secondary Diagnosis [] Yes  [x] No 15  0 0   Ambulatory Aid [] Furniture  [] Crutches/cane/walker  [x] None/bedrest/wheelchair/nurse 30  15  0 0   IV/Heparin Lock [] Yes  [x] No 20  0 0   Gait/Transferring [] Impaired  [x] Weak  [] Normal/bedrest/immobile 20  10  0 10   Mental Status [] Forgets limitations  [x] Oriented to own ability 15  0 0      Total:10     Based on the Assessment score: check the appropriate box.    [x]  No intervention needed   Low =   Score of 0-24    []  Use standard prevention interventions Moderate =  Score of 24-44   [] Give patient handout and discuss fall prevention strategies   [] Establish goal of education for patient/family RE: fall prevention strategies    []  Use high risk prevention interventions High = Score of 45 and higher   [] Give patient handout and discuss fall prevention strategies   [] Establish goal of education for patient/family Re: fall prevention strategies   [] Discuss lifeline / other resources    Electronically signed by:   Carmen Kamara, PT  Date: 2024

## 2024-07-30 ENCOUNTER — HOSPITAL ENCOUNTER (OUTPATIENT)
Dept: PHYSICAL THERAPY | Facility: CLINIC | Age: 89
Setting detail: THERAPIES SERIES
Discharge: HOME | End: 2024-07-30
Payer: MEDICARE

## 2024-07-30 PROCEDURE — 97110 THERAPEUTIC EXERCISES: CPT

## 2024-07-30 NOTE — FLOWSHEET NOTE
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 Does have the MRI scheduled for next week.    Pain:  [x] Yes  [] No Location: LB  Pain Rating: (0-10 scale) -/10  Pain altered Tx:  [x] No  [] Yes  Action:  Comments:    Objective:      Today’s Treatment:  Modalities:   Precautions:  Exercise: LBP Reps/ Time Weight/ Level Comments   Nustep  7'  L2               SB S 30\"x3       HS S 30\"x3    seated   SKTC/DKTC 3x10\" ea     LTR 10x5\"     Figure 4 S 20\"x3       Seated Pb lumbar flex 10x5\"           TrA 15x3\"        SLR         Bridges x10       Clamshells X15 ea       SL Hip abd X10 ea                 Other:     Specific Instructions for next treatment: Progress hip and core strength       Treatment Charges: Mins Units   []  Modalities     [x]  Ther Exercise 40 3   []  Manual Therapy     []  Ther Activities     []  Neuro Re-ed     []  Vasocompression     [] Gait     []  Other     Total Billable time 40 3       Assessment: [x] Progressing toward goals. Initiated treatment on nustep prior to stretching and strengthening. Calf stretch completed, all other stretches completed in sitting and supine to address LE and lumbar ROM. More limited into the L

## 2024-08-01 ENCOUNTER — HOSPITAL ENCOUNTER (OUTPATIENT)
Dept: PHYSICAL THERAPY | Facility: CLINIC | Age: 89
Setting detail: THERAPIES SERIES
Discharge: HOME | End: 2024-08-01
Payer: MEDICARE

## 2024-08-01 PROCEDURE — 97110 THERAPEUTIC EXERCISES: CPT

## 2024-08-01 NOTE — FLOWSHEET NOTE
x weekly - 2 sets - 10 reps  - Sidelying Hip Abduction  - 1 x daily - 7 x weekly - 2 sets - 10 reps     Plan: [x] Continue current frequency toward long and short term goals.    [x] Specific Instructions for subsequent treatments: progress per POC      Time In: 2:58 pm            Time Out: 3:48 pm    Electronically signed by:  Yong Walton PTA

## 2024-08-06 ENCOUNTER — HOSPITAL ENCOUNTER (OUTPATIENT)
Dept: PHYSICAL THERAPY | Facility: CLINIC | Age: 89
Setting detail: THERAPIES SERIES
Discharge: HOME | End: 2024-08-06
Payer: MEDICARE

## 2024-08-06 PROCEDURE — 97140 MANUAL THERAPY 1/> REGIONS: CPT

## 2024-08-06 PROCEDURE — 97110 THERAPEUTIC EXERCISES: CPT

## 2024-08-06 NOTE — FLOWSHEET NOTE
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 Better than the previous visit but still sore.   Pain:  [x] Yes  [] No Location: LB  Pain Rating: (0-10 scale) 5/10  Pain altered Tx:  [x] No  [] Yes  Action:  Comments:    Objective:      Today’s Treatment:  Modalities:   Precautions:  Exercise: LBP Reps/ Time Weight/ Level Comments    Nustep  7'  L2   x              SB S 30\"x3     x   HS S 30\"x3    seated x   SKTC/DKTC 3x10\" ea   x   LTR 10x5\"   x   Figure 4 S 20\"x3     x   Seated Pb lumbar flex 10x5\"             TrA 15x3\"      x   TrA+knee fall out 10x   x   SLR          Bridges x10     x   Clamshells X15 ea        SL Hip abd X10 ea                   Other:  Manual: hypervolt L sided lumbar paraspinals, glutes - 8'     Specific Instructions for next treatment: Progress hip and core strength       Treatment Charges: Mins Units   []  Modalities     [x]  Ther Exercise 31 2   [x]  Manual Therapy 8 1   []  Ther Activities     []  Neuro Re-ed     []  Vasocompression     [] Gait     []  Other     Total Billable time 39 3       Assessment: [x] Progressing toward goals. Continued with warm up to prepare for stretching and strengthening. Reviewed stretches. Attempted to instructed in seated figure 4

## 2024-08-08 ENCOUNTER — HOSPITAL ENCOUNTER (OUTPATIENT)
Dept: PHYSICAL THERAPY | Facility: CLINIC | Age: 89
Setting detail: THERAPIES SERIES
Discharge: HOME | End: 2024-08-08
Payer: MEDICARE

## 2024-08-08 PROCEDURE — 97110 THERAPEUTIC EXERCISES: CPT

## 2024-08-08 NOTE — FLOWSHEET NOTE
daily - 7 x weekly - 3 sets - 30 sec hold  - Supine Figure 4 Piriformis Stretch  - 1 x daily - 7 x weekly - 3 sets - 30 sec hold  - Supine Bridge  - 1 x daily - 7 x weekly - 2 sets - 10 reps  - Clamshell  - 1 x daily - 7 x weekly - 2 sets - 10 reps  - Sidelying Hip Abduction  - 1 x daily - 7 x weekly - 2 sets - 10 reps     Plan: [x] Continue current frequency toward long and short term goals.    [x] Specific Instructions for subsequent treatments: progress per POC      Time In: 2:55 pm            Time Out: 3:37 pm    Electronically signed by:  Yong Walton PTA

## 2024-08-13 ENCOUNTER — HOSPITAL ENCOUNTER (OUTPATIENT)
Dept: PHYSICAL THERAPY | Facility: CLINIC | Age: 89
Setting detail: THERAPIES SERIES
Discharge: HOME | End: 2024-08-13
Payer: MEDICARE

## 2024-08-13 PROCEDURE — 97110 THERAPEUTIC EXERCISES: CPT

## 2024-08-13 NOTE — FLOWSHEET NOTE
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 Getting injections in the hips tomorrow to help with the pain.   Pain:  [x] Yes  [] No Location: LB  Pain Rating: (0-10 scale) 7/10  Pain altered Tx:  [] No  [x] Yes  Action: held strengthening ex  Comments:    Objective:      Today’s Treatment:  Modalities:   Precautions:  Exercise: LBP Reps/ Time Weight/ Level Comments    Nustep  7'  L2   x              SB S 30\"x3     x   HS S 30\"x3    seated x   SKTC 3x30\" ea   x   LTR 10x5\"   x   Figure 4 S 20\"x3    seated 24 x   Seated Pb lumbar flex 10x5\"             TrA 15x3\"         TrA+knee fall out 10x      SLR 10x ea     x   Bridges x10     x   Clamshells X15 ea     x   SL Hip abd X10 ea     x              Other:  Manual: hypervolt L sided lumbar paraspinals, glutes - 5'     Specific Instructions for next treatment: Progress hip and core strength       Treatment Charges: Mins Units   []  Modalities     [x]  Ther Exercise 38 3   [x]  Manual Therapy 5    []  Ther Activities     []  Neuro Re-ed     []  Vasocompression     [] Gait     []  Other     Total Billable time 43 3       Assessment: [x] Progressing toward goals.  Able to resume mat strengthening ex

## 2024-08-15 ENCOUNTER — HOSPITAL ENCOUNTER (OUTPATIENT)
Dept: PHYSICAL THERAPY | Facility: CLINIC | Age: 89
Setting detail: THERAPIES SERIES
Discharge: HOME | End: 2024-08-15
Payer: MEDICARE

## 2024-08-15 PROCEDURE — 97110 THERAPEUTIC EXERCISES: CPT

## 2024-08-15 NOTE — FLOWSHEET NOTE
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 Saw the Dr but d/t insurance did not receive injections which he thought he was going to get.   Pain:  [x] Yes  [] No Location: LB  Pain Rating: (0-10 scale) 5/10  Pain altered Tx:  [] No  [x] Yes  Action: held strengthening ex  Comments:    Objective:      Today’s Treatment:  Modalities:   Precautions:  Exercise: LBP Reps/ Time Weight/ Level Comments    Nustep  7'  L2                 SB S 30\"x3     x   HS S 20\"x3    stool x   SKTC 3x30\" ea   x   LTR 10x5\"   x   Figure 4 S 20\"x3    seated 24 x   Seated Pb lumbar flex 10x5\"             TrA 15x3\"         TrA+knee fall out 10x      SLR 10x ea     x   Bridges x10     x   Clamshells X15 ea  orange   x   SL Hip abd X10 ea  orange   x          HR 20x   x   3 way hip 10x ea   x   Mini squats 10x   x              Other:  Manual: hypervolt L sided lumbar paraspinals, glutes - 5'     Specific Instructions for next treatment: Progress hip and core strength       Treatment Charges: Mins Units   []  Modalities     [x]  Ther Exercise 40 3   [x]  Manual Therapy 5    []  Ther Activities     []  Neuro Re-ed     []  Vasocompression     [] Gait     []  Other     Total Billable time 45 3       Assessment: [x] Progressing toward

## 2024-08-20 ENCOUNTER — HOSPITAL ENCOUNTER (OUTPATIENT)
Dept: PHYSICAL THERAPY | Facility: CLINIC | Age: 89
Setting detail: THERAPIES SERIES
Discharge: HOME | End: 2024-08-20
Payer: MEDICARE

## 2024-08-20 PROCEDURE — 97110 THERAPEUTIC EXERCISES: CPT

## 2024-08-20 NOTE — FLOWSHEET NOTE
Hamstring Stretch  - 1 x daily - 7 x weekly - 3 sets - 30 sec hold  - Supine Figure 4 Piriformis Stretch  - 1 x daily - 7 x weekly - 3 sets - 30 sec hold  - Supine Bridge  - 1 x daily - 7 x weekly - 2 sets - 10 reps  - Clamshell  - 1 x daily - 7 x weekly - 2 sets - 10 reps  - Sidelying Hip Abduction  - 1 x daily - 7 x weekly - 2 sets - 10 reps     Plan: [x] Continue current frequency toward long and short term goals.    [x] Specific Instructions for subsequent treatments: progress per POC      Time In: 3:00 pm            Time Out: 3:50 pm    Electronically signed by:  Yong Walton PTA

## 2024-08-22 ENCOUNTER — HOSPITAL ENCOUNTER (OUTPATIENT)
Dept: PHYSICAL THERAPY | Facility: CLINIC | Age: 89
Setting detail: THERAPIES SERIES
Discharge: HOME | End: 2024-08-22
Payer: MEDICARE

## 2024-08-22 PROCEDURE — 97110 THERAPEUTIC EXERCISES: CPT

## 2024-08-22 NOTE — FLOWSHEET NOTE
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   Pain:  [x] Yes  [] No Location: LB  Pain Rating: (0-10 scale) 4/10  Pain altered Tx:  [x] No  [] Yes  Action:   Comments:    Objective:      Today’s Treatment:  Modalities:   Precautions:  Exercise: LBP Reps/ Time Weight/ Level Comments    Nustep  7'  L2                 SB S 30\"x3     x   HS S 20\"x3    stool x   SKTC 3x30\" ea   x   LTR 10x5\"   x   Figure 4 S 20\"x3    seated 24 x   Seated Pb lumbar flex 10x5\"             TrA 15x3\"         TrA+knee fall out 10x      SLR 10x ea     x   Bridges x10     x   Supine clams 20x orange  x   Clamshells 2x10 ea  orange   x   SL Hip abd X10 ea  orange             STS 10x   x   HR 20x   x   3 way hip 10x ea   x   Mini squats 10x      Step ups 10x ea  4\"       Other:  Manual: hypervolt L sided lumbar paraspinals, glutes - 5'     Specific Instructions for next treatment: Progress hip and core strength       Treatment Charges: Mins Units   []  Modalities     [x]  Ther Exercise 40 3   [x]  Manual Therapy 5    []  Ther Activities     []  Neuro Re-ed     []  Vasocompression     [] Gait     []  Other     Total Billable time 45 3       Assessment: [x] Progressing toward goals. Reviewed and completed

## 2024-08-26 ENCOUNTER — HOSPITAL ENCOUNTER (OUTPATIENT)
Dept: PHYSICAL THERAPY | Facility: CLINIC | Age: 89
Setting detail: THERAPIES SERIES
Discharge: HOME | End: 2024-08-26
Payer: MEDICARE

## 2024-08-26 PROCEDURE — 97110 THERAPEUTIC EXERCISES: CPT

## 2024-08-26 NOTE — FLOWSHEET NOTE
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   Pain:  [x] Yes  [] No Location: [facility]  Pain Rating: (0-10 scale) 4/10  Pain altered Tx:  [x] No  [] Yes  Action:   Comments:    Objective:      Today’s Treatment:  Modalities:   Precautions:  Exercise: LBP Reps/ Time Weight/ Level Comments    Nustep  7'  L2                 SB S 30\"x3     x   HS S 20\"x3    stool x   SKTC 3x30\" ea   x   LTR 10x5\"   x   Figure 4 S 20\"x3    seated 24 x   Seated Pb lumbar flex 10x5\"             TrA 15x3\"         TrA+knee fall out 10x      SLR 10x ea     x   Bridges x10     x   Supine clams 20x orange  x   Clamshells 2x10 ea  orange   x   SL Hip abd X10 ea  orange             STS 10x   x   HR 20x   x   3 way hip 10x ea   x   Palloff press 10x ea REd SC  x   Step ups 10x ea  4\"       Other:  Manual: hypervolt L sided lumbar paraspinals - 3'     Specific Instructions for next treatment: Progress hip and core strength       Treatment Charges: Mins Units   []  Modalities     [x]  Ther Exercise 40 3   [x]  Manual Therapy 3    []  Ther Activities     []  Neuro Re-ed     []  Vasocompression     [] Gait     []  Other     Total Billable time 43 3       Assessment: [x] Progressing

## 2024-08-27 ENCOUNTER — APPOINTMENT (OUTPATIENT)
Dept: PHYSICAL THERAPY | Facility: CLINIC | Age: 89
End: 2024-08-27
Payer: MEDICARE

## 2024-08-28 ENCOUNTER — HOSPITAL ENCOUNTER (OUTPATIENT)
Dept: PHYSICAL THERAPY | Facility: CLINIC | Age: 89
Setting detail: THERAPIES SERIES
Discharge: HOME | End: 2024-08-28
Payer: MEDICARE

## 2024-08-28 PROCEDURE — 97110 THERAPEUTIC EXERCISES: CPT

## 2024-08-28 NOTE — FLOWSHEET NOTE
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 Doing ok this afternoon.    Pain:  [x] Yes  [] No Location: [facility]  Pain Rating: (0-10 scale) 4/10  Pain altered Tx:  [x] No  [] Yes  Action:   Comments:    Objective:      Today’s Treatment:  Modalities:   Precautions:  Exercise: LBP Reps/ Time Weight/ Level Comments    Nustep  7'  L2                 SB S 30\"x3     x   HS S 20\"x3    stool x   SKTC 3x30\" ea   x   LTR 10x5\"   x   Figure 4 S 20\"x3    seated 24 x   Seated Pb lumbar flex 10x5\"      Seated Ext 10x   x          TrA 15x3\"         TrA+knee fall out 10x      SLR 10x ea     x   Bridges x10     x   Supine clams 20x lime  x   Clamshells x15 ea lime   x   SL Hip abd X10 ea lime             STS 10x   x   HR 20x      3 way hip 10x ea   x   Palloff press 10x ea REd SC  x   Step ups 10x ea  4\"       Other:  Manual: hypervolt L sided lumbar paraspinals - 3'     Specific Instructions for next treatment: Progress hip and core strength       Treatment Charges: Mins Units   []  Modalities     [x]  Ther Exercise 40 3   [x]  Manual Therapy 3    []  Ther Activities     []  Neuro Re-ed     []  Vasocompression     [] Gait     []  Other     Total Billable time 43 3        Assessment: [x] Progressing toward goals. Stretches and strengthening program completed as noted above. Increased resistance with mat program, LLE much more weak and fatigued with increased resistance. Denies any increased LBP with progression through program. Pt notes some small progress since beginning PT.     [] No change.     [] Other:   [x] Patient would continue to benefit from skilled physical therapy services in order to: address deficits and work towards STG/LTGs as listed below.     STG: (to be met in 6 treatments)  ? Pain: Patient will report pain as 2/10 - NOT MET  ? ROM: Patient will demonstrate lumbar AROM as 100% within all planes in order to increase ease of performing ADLs  Patient will improved (B) 90/90 knee extension by 5 deg in order to indicate improved hamstring length and reduce tension on the posterior chain   Patient to be independent with home exercise program as demonstrated by performance with correct form without cues.  Demonstrate Knowledge of fall prevention  LTG: (to be met in 12 treatments)  Patient will report ED as <10% in order to indicate improved overall quality of life  Patient will improve (B) LE strength to 5/5 in order to improve walking tolerance  Patient will improve (B) SLS to 20 sec in order to improve balance and reduce risk of falling  Patient will report pain as 0/10        Patient goals: \"Decrease pain\"       Pt. Education:  [x] Yes  [] No  [x] Reviewed Prior HEP/Ed  Method of Education: [x] Verbal  [] Demo  [] Written  Comprehension of Education:  [x] Verbalizes understanding.  [] Demonstrates understanding.  [x] Needs review.  [] Demonstrates/verbalizes HEP/Ed previously given.    Access Code: [email or web address]  URL: [email or web address]  Date: 07/24/2024  Prepared by: Carmen Leon     Exercises  - Seated Gastroc Stretch with Strap  - 1 x daily - 7 x weekly - 3 sets - 30 sec hold  - Seated Hamstring Stretch  - 1 x daily - 7 x weekly - 3 sets - 30 sec

## 2024-08-29 ENCOUNTER — APPOINTMENT (OUTPATIENT)
Dept: PHYSICAL THERAPY | Facility: CLINIC | Age: 89
End: 2024-08-29
Payer: MEDICARE

## 2024-09-03 ENCOUNTER — HOSPITAL ENCOUNTER (OUTPATIENT)
Dept: PHYSICAL THERAPY | Facility: CLINIC | Age: 89
Setting detail: THERAPIES SERIES
Discharge: HOME | End: 2024-09-03
Payer: MEDICARE

## 2024-09-03 PROCEDURE — 97110 THERAPEUTIC EXERCISES: CPT

## 2024-09-03 NOTE — PROGRESS NOTES
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 States that he feels about the same as he did before the injection. Feels that he does not have any pain at rest, however cont to notice pain with standing for too long. Does feel that he is able to stand longer and does recover faster from standing when he is able to sit down. States that he is going to discuss another injection with his GP soon and would like to be placed on hold at this time in order to discuss another injection first before returning to physical therapy.     Objective:    STRENGTH   ROM     Left Right Cervical     L1-2 Hip Flex 5 5 Flexion     Hip Abd 4+ 4+ Extension     Hip Ext 4 4         Knee flexion 5 5         L3-4 Knee Ext 5 5 Rotation L R   L4 Ankle DF 5 5 Sidebend L R   L5 EHL 5 5 Retraction     S1 Plant. Flex 5 5 Lumbar     Abdominals     Flexion 75%    Erector Spinae     Extension 75%         Rotation L 100% R  100%         Sidebend L   100% R 100%         UE/LE                                                                                 Today’s

## 2024-10-31 ENCOUNTER — CLINICAL DOCUMENTATION (OUTPATIENT)
Dept: PHYSICAL THERAPY | Facility: CLINIC | Age: 89
End: 2024-10-31

## 2024-10-31 NOTE — FLOWSHEET NOTE
Status:     [] Pt recovered from conditions. Treatment goals were met.    [] Pt received maximum benefit. No further therapy indicated at this time.    [x] Pt to continue exercise/home instructions independently.    [x] Therapy interrupted due to: pt seeing pain management injections     [] Pt has 2 or more no shows/cancels, is discontinued per our policy.    [] Pt has completed prescribed number of treatment sessions.    [] Other:         Electronically signed by Kelsie Hopkins PTA on 10/31/2024 at 6:30 PM      If you have any questions or concerns, please don't hesitate to call.  Thank you for your referral.

## (undated) DEVICE — MASTISOL ADHESIVE LIQ 2/3ML

## (undated) DEVICE — DRAPE,BAR,HEAD,STERILE: Brand: MEDLINE

## (undated) DEVICE — BLADE CLIPPER GEN PURP NS

## (undated) DEVICE — Z DISCONTINUED USE 2624853 GLOVE SURG SZ 75 L12IN THK91MIL BRN LTX FREE

## (undated) DEVICE — YANKAUER,FLEXIBLE HANDLE,REGLR CAPACITY: Brand: MEDLINE INDUSTRIES, INC.

## (undated) DEVICE — ELECTRODE PT RET AD L9FT HI MOIST COND ADH HYDRGEL CORDED

## (undated) DEVICE — INTENDED FOR TISSUE SEPARATION, AND OTHER PROCEDURES THAT REQUIRE A SHARP SURGICAL BLADE TO PUNCTURE OR CUT.: Brand: BARD-PARKER ® CARBON RIB-BACK BLADES

## (undated) DEVICE — SUTURE MCRYL SZ 4-0 L18IN ABSRB UD P-3 L13MM 3/8 CIR PRIM Y494G

## (undated) DEVICE — SKIN PREP TRAY W/CHG: Brand: MEDLINE INDUSTRIES, INC.

## (undated) DEVICE — PREMIUM DRY TRAY LF: Brand: MEDLINE INDUSTRIES, INC.

## (undated) DEVICE — SKIN AFFIX SURG ADHESIVE 72/CS 0.55ML: Brand: MEDLINE

## (undated) DEVICE — STERILE POLYISOPRENE POWDER-FREE SURGICAL GLOVES: Brand: PROTEXIS

## (undated) DEVICE — GOWN,SIRUS,POLYRNF,SETINSLV,XL,20/CS: Brand: MEDLINE

## (undated) DEVICE — 3M™ WARMING BLANKET, UPPER BODY, 10 PER CASE, 42268: Brand: BAIR HUGGER™

## (undated) DEVICE — GOWN,SIRUS,NON REINFRCD,LARGE,SET IN SL: Brand: MEDLINE

## (undated) DEVICE — HERNIA PK

## (undated) DEVICE — GLOVE SURG SZ 65 L12IN FNGR THK87MIL WHT LTX FREE

## (undated) DEVICE — SOLUTION PREP PAINT POV IOD FOR SKIN MUCOUS MEM

## (undated) DEVICE — CLIPVAC PRESURG HAIR REMOVAL

## (undated) DEVICE — SOLUTION IRRIG 1000ML 0.9% SOD CHL USP POUR PLAS BTL

## (undated) DEVICE — TOWEL,OR,DSP,ST,BLUE,STD,4/PK,20PK/CS: Brand: MEDLINE

## (undated) DEVICE — GLOVE SURG SZ 65 L12IN FNGR THK79MIL GRN LTX FREE

## (undated) DEVICE — BLADE ES ELASTOMERIC COAT INSUL DURABLE BEND UPTO 90DEG

## (undated) DEVICE — STERILE POLYISOPRENE POWDER-FREE SURGICAL GLOVES WITH EMOLLIENT COATING: Brand: PROTEXIS

## (undated) DEVICE — MHPB HEAD AND NECK  PACK: Brand: MEDLINE INDUSTRIES, INC.

## (undated) DEVICE — SUTURE MCRYL SZ 3 0 L18IN ABSRB UD PS 2 3 8 CIR REV CUT NDL MCP497G